# Patient Record
Sex: FEMALE | Race: WHITE | Employment: UNEMPLOYED | URBAN - METROPOLITAN AREA
[De-identification: names, ages, dates, MRNs, and addresses within clinical notes are randomized per-mention and may not be internally consistent; named-entity substitution may affect disease eponyms.]

---

## 2024-06-02 ENCOUNTER — HOSPITAL ENCOUNTER (EMERGENCY)
Age: 39
Discharge: HOME | End: 2024-06-02
Attending: EMERGENCY MEDICINE
Payer: COMMERCIAL

## 2024-06-02 VITALS
RESPIRATION RATE: 18 BRPM | WEIGHT: 127 LBS | HEART RATE: 86 BPM | BODY MASS INDEX: 19.93 KG/M2 | SYSTOLIC BLOOD PRESSURE: 135 MMHG | TEMPERATURE: 98 F | DIASTOLIC BLOOD PRESSURE: 78 MMHG | OXYGEN SATURATION: 99 % | HEIGHT: 67 IN

## 2024-06-02 DIAGNOSIS — T78.40XA ALLERGIC REACTION, INITIAL ENCOUNTER: Primary | ICD-10-CM

## 2024-06-02 PROCEDURE — 99283 EMERGENCY DEPT VISIT LOW MDM: CPT

## 2024-06-02 RX ORDER — EPINEPHRINE 0.3 MG/.3ML
0.3 INJECTION SUBCUTANEOUS
Qty: 1 EACH | Refills: 0 | Status: SHIPPED | OUTPATIENT
Start: 2024-06-02 | End: 2024-07-02

## 2024-06-02 RX ORDER — PREDNISONE 20 MG/1
60 TABLET ORAL ONCE
Status: COMPLETED | OUTPATIENT
Start: 2024-06-02 | End: 2024-06-02

## 2024-06-02 RX ORDER — FAMOTIDINE 20 MG/1
20 TABLET, FILM COATED ORAL 2 TIMES DAILY
Qty: 10 TABLET | Refills: 0 | Status: SHIPPED | OUTPATIENT
Start: 2024-06-02 | End: 2024-06-07

## 2024-06-02 RX ORDER — PREDNISONE 20 MG/1
40 TABLET ORAL DAILY
Qty: 10 TABLET | Refills: 0 | Status: SHIPPED | OUTPATIENT
Start: 2024-06-02 | End: 2024-06-07

## 2024-06-02 RX ORDER — FAMOTIDINE 20 MG/1
20 TABLET, FILM COATED ORAL ONCE
Status: COMPLETED | OUTPATIENT
Start: 2024-06-02 | End: 2024-06-02

## 2024-06-03 NOTE — ED INITIAL ASSESSMENT (HPI)
Pt has had nausea all day, and then after dinner her hands started itching. Then a macropapular rash broke out on her arms. Denies throat swelling/diff breathing. Lips not swollen. Took 25mg of benadryl at 8pm.

## 2024-06-03 NOTE — ED PROVIDER NOTES
Patient Seen in: [facility] In [place]      History     Chief Complaint   Patient presents with    Allergic Rxn Allergies     Stated Complaint: Allergic reaction. No swelling to tongue or lips. No carmen. She took 1 Benadryl t*    Subjective:   HPI    39-year-old female presents reporting sudden outbreak of a pruritic rash over her arms and torso.  She states she also developed some abdominal discomfort and diarrhea.  She tried  taking some Benadryl and she says it feels like it is helping to calm the symptoms down.  She does not have any similar allergic reactions in the past.  She does report allergy to sulfa.  She cannot think of anything of the unusual that she may have eaten today.  She did start using a new deodorant today but put that on hours before any symptoms began.  She denies any chest pain, palpitations, shortness of breath.  No swelling to the face, lips, tongue.  No throat discomfort.    Objective:   History reviewed. No pertinent past medical history.           History reviewed. No pertinent surgical history.             Social History     Socioeconomic History    Marital status: Unknown   Tobacco Use    Smoking status: Never    Smokeless tobacco: Never   Vaping Use    Vaping status: Never Used   Substance and Sexual Activity    Alcohol use: Never    Drug use: Never              Review of Systems    Positive for stated complaint: Allergic reaction. No swelling to tongue or lips. No carmen. She took 1 Benadryl t*  Other systems are as noted in HPI.  Constitutional and vital signs reviewed.      All other systems reviewed and negative except as noted above.    Physical Exam     ED Triage Vitals [06/02/24 2105]   /78   Pulse 86   Resp 18   Temp 98.1 °F (36.7 °C)   Temp src    SpO2 99 %   O2 Device None (Room air)       Current Vitals:   Vital Signs  BP: 135/78  Pulse: 86  Resp: 18  Temp: 98.1 °F (36.7 °C)    Oxygen Therapy  SpO2: 99 %  O2 Device: None (Room  air)            Physical Exam    General:  Vitals as listed.  No acute distress   HEENT: Posterior pharynx is without edema.  Swallowing without difficulty and tolerating secretions.  Normal voice.  No swelling to the tongue or subglossal region.  No swelling to the face.  No conjunctival injection or increased tearing.  Lungs: good air exchange and clear without wheeze  Heart: regular rate rhythm and no murmur   Abdomen: Soft and nontender.  No abdominal masses.  No peritoneal signs   Skin: She has a raised maculopapular rash to the extremities.  It is also present to the back and a lesser degree.  No rash to the face or neck    ED Course   Labs Reviewed - No data to display                   MDM      39-year-old female presents reporting she feels she is having allergic reaction.  She does have a maculopapular rash to the arms.  No oropharyngeal symptoms.  Denies any chest pain.  Lungs are clear to auscultation.  No swelling to the face.  No similar episodes in the past.    Differential includes but is not limited to generalized allergic reaction, contact dermatitis, anaphylaxis, a life threat.    On examination she does not have any symptoms of anaphylaxis.  She is well-appearing.  She does have a pruritic rash.  Given prednisone and famotidine here.  She has Benadryl at home.  Will discharge home with prescription for prednisone and famotidine for the next 5 days as well as an EpiPen.  Recommend follow-up with primary care doctor.  We did discuss that if she has any worsening symptoms she needs to return to the emergency room, use an EpiPen, call 911 as needed.                                     Medical Decision Making      Disposition and Plan     Clinical Impression:  1. Allergic reaction, initial encounter         Disposition:  Discharge  6/2/2024 10:04 pm    Follow-up:  Stone Rodríguez  [address] [place] [id number]  [phone]    Follow up      Angelica Sun MD  [address] [place] [address]  [place]  [facility] [address]  [phone]    Follow up  Chronic information for primary care doctor for need someone to follow-up with          Medications Prescribed:  Current Discharge Medication List        START taking these medications    Details   predniSONE 20 MG Oral Tab Take 2 tablets (40 mg total) by mouth daily for 5 days.  Qty: 10 tablet, Refills: 0      famotidine 20 MG Oral Tab Take 1 tablet (20 mg total) by mouth 2 (two) times daily for 5 days.  Qty: 10 tablet, Refills: 0      EPINEPHrine 0.3 MG/0.3ML Injection Solution Auto-injector Inject 0.3 mL (1 each total) into the muscle daily as needed.  Qty: 1 each, Refills: 0

## 2025-05-14 ENCOUNTER — OFFICE VISIT (OUTPATIENT)
Dept: OBGYN CLINIC | Facility: CLINIC | Age: 40
End: 2025-05-14
Payer: COMMERCIAL

## 2025-05-14 VITALS
SYSTOLIC BLOOD PRESSURE: 110 MMHG | DIASTOLIC BLOOD PRESSURE: 64 MMHG | HEIGHT: 67 IN | BODY MASS INDEX: 20.64 KG/M2 | HEART RATE: 99 BPM | WEIGHT: 131.5 LBS

## 2025-05-14 DIAGNOSIS — Z01.419 WELL WOMAN EXAM WITH ROUTINE GYNECOLOGICAL EXAM: Primary | ICD-10-CM

## 2025-05-14 DIAGNOSIS — N94.0 MITTELSCHMERZ: ICD-10-CM

## 2025-05-14 DIAGNOSIS — Z12.4 CERVICAL CANCER SCREENING: ICD-10-CM

## 2025-05-14 DIAGNOSIS — G43.829 MENSTRUAL MIGRAINE WITHOUT STATUS MIGRAINOSUS, NOT INTRACTABLE: ICD-10-CM

## 2025-05-14 DIAGNOSIS — Z12.31 ENCOUNTER FOR SCREENING MAMMOGRAM FOR BREAST CANCER: ICD-10-CM

## 2025-05-14 PROCEDURE — 3074F SYST BP LT 130 MM HG: CPT | Performed by: NURSE PRACTITIONER

## 2025-05-14 PROCEDURE — 99386 PREV VISIT NEW AGE 40-64: CPT | Performed by: NURSE PRACTITIONER

## 2025-05-14 PROCEDURE — 3078F DIAST BP <80 MM HG: CPT | Performed by: NURSE PRACTITIONER

## 2025-05-14 PROCEDURE — 3008F BODY MASS INDEX DOCD: CPT | Performed by: NURSE PRACTITIONER

## 2025-05-14 RX ORDER — MULTIVITAMIN
1 TABLET ORAL DAILY
COMMUNITY

## 2025-05-14 RX ORDER — ACETAMINOPHEN AND CODEINE PHOSPHATE 120; 12 MG/5ML; MG/5ML
0.35 SOLUTION ORAL DAILY
Qty: 84 TABLET | Refills: 0 | Status: SHIPPED | OUTPATIENT
Start: 2025-05-14 | End: 2026-05-14

## 2025-05-14 RX ORDER — UBROGEPANT 100 MG/1
TABLET ORAL
COMMUNITY
Start: 2025-05-08

## 2025-05-14 NOTE — PROGRESS NOTES
Here for new gynecology visit.  40 year old G 4 P 2.  Patient's last menstrual period was 2025 (exact date)..     Here for Annual Gynecologic Exam.     Menses Q 28 days for 5-7 days.  Nothing for contraception.    She has had worsening migraines with her menses since the age of 30. She also notes they have worsened with ovulation.as well.    Last pap smear was in  and it was normal.  No hx of abnormal pap smears.     OB Hx:  2 , 2 AB.    Family gyn hx:  neg.   Family breast hx:  neg.  Last mammogram in neg.  Screening labs unsure.    Past Medical History[1]    Past Surgical History[2]    Medications Ordered Prior to Encounter[3]    OB History    Para Term  AB Living   4 2 0 0 2 2   SAB IAB Ectopic Multiple Live Births   0 2 0 0 2      # Outcome Date GA Lbr Nolan/2nd Weight Sex Type Anes PTL Lv   4 Para 17    F Vag-Spont EPI  MATTY   3 IAB            2 Para 05    M Vag-Spont EPI  MATTY   1 IAB                ROS:    General:  No wt loss, wt gain, appetite changes.  Breasts:  No pain, lumps or secretions.  :   No urgency, frequency, TOSHA, bladder problems in past.    /64   Pulse 99   Ht 67\"   Wt 131 lb 8 oz (59.6 kg)   LMP 2025 (Exact Date)   BMI 20.60 kg/m²     NECK:  Thyroid normal size without nodules. No adenopathy.  LUNGS:  Clear to auscultation.  COR;  Regular rate and rhythm.    BREASTS:  symmetrical in shape. No masses, tenderness, secretions, or adenopathy.  ABDOMEN:  Soft and non tender.  No organomegaly.  No inguinal adenopathy.  VULVA:  No lesions or erythema.  VAGINA:  No lesions, scant discharge.  CERVIX:  No lesions or CMT.  Pap smear done with HPV.  UTERUS:  anteflexed and normal size.  ADNEXA:  No pain or masses.    IMP/PLAN:    1. Well woman exam with routine gynecological exam    2. Cervical cancer screening  - ThinPrep PAP with HPV Reflex Request; Future    3. Encounter for screening mammogram for breast cancer  Regular self breast exams  recommended  - [facility] 2D+3D SCREENING BILAT (CPT=77067/64090); Future    4. Menstrual migraine without status migrainosus, not intractable  Advised on use, back up birth control  - Norethindrone (ORTHO MICRONOR) 0.35 MG Oral Tab; Take 1 tablet (0.35 mg total) by mouth daily.  Dispense: 84 tablet; Refill: 0    5. [place]merz  - Norethindrone (ORTHO MICRONOR) 0.35 MG Oral Tab; Take 1 tablet (0.35 mg total) by mouth daily.  Dispense: 84 tablet; Refill: 0    See in 3 months for follow up.         [1]   Past Medical History:   Migraine with aura   [2]   Past Surgical History:  Procedure Laterality Date    D & c  2017    Leep  2017   [3]   Current Outpatient Medications on File Prior to Visit   Medication Sig Dispense Refill    Multiple Vitamin Oral Tab Take 1 tablet by mouth daily.      UBRELVY 100 MG Oral Tab Oral for 30 Days       No current facility-administered medications on file prior to visit.

## 2025-05-20 LAB — HPV E6+E7 MRNA CVX QL NAA+PROBE: NEGATIVE

## 2025-06-02 ENCOUNTER — HOSPITAL ENCOUNTER (OUTPATIENT)
Dept: MAMMOGRAPHY | Age: 40
Discharge: HOME | End: 2025-06-02
Attending: NURSE PRACTITIONER
Payer: COMMERCIAL

## 2025-06-02 DIAGNOSIS — Z12.31 ENCOUNTER FOR SCREENING MAMMOGRAM FOR BREAST CANCER: ICD-10-CM

## 2025-06-02 PROCEDURE — 77067 SCR MAMMO BI INCL CAD: CPT | Performed by: NURSE PRACTITIONER

## 2025-06-02 PROCEDURE — 77063 BREAST TOMOSYNTHESIS BI: CPT | Performed by: NURSE PRACTITIONER

## 2025-06-20 ENCOUNTER — HOSPITAL ENCOUNTER (OUTPATIENT)
Dept: MAMMOGRAPHY | Facility: HOSPITAL | Age: 40
Discharge: HOME | End: 2025-06-20
Attending: NURSE PRACTITIONER
Payer: COMMERCIAL

## 2025-06-20 DIAGNOSIS — R92.2 INCONCLUSIVE MAMMOGRAM: ICD-10-CM

## 2025-06-20 PROCEDURE — 76642 ULTRASOUND BREAST LIMITED: CPT | Performed by: NURSE PRACTITIONER

## 2025-06-20 PROCEDURE — 77061 BREAST TOMOSYNTHESIS UNI: CPT | Performed by: NURSE PRACTITIONER

## 2025-06-20 PROCEDURE — 77065 DX MAMMO INCL CAD UNI: CPT | Performed by: NURSE PRACTITIONER

## 2025-08-12 DIAGNOSIS — N94.0 MITTELSCHMERZ: ICD-10-CM

## 2025-08-12 DIAGNOSIS — G43.829 MENSTRUAL MIGRAINE WITHOUT STATUS MIGRAINOSUS, NOT INTRACTABLE: ICD-10-CM

## 2025-08-13 RX ORDER — ACETAMINOPHEN AND CODEINE PHOSPHATE 120; 12 MG/5ML; MG/5ML
0.35 SOLUTION ORAL DAILY
Qty: 84 TABLET | Refills: 0 | Status: SHIPPED | OUTPATIENT
Start: 2025-08-13